# Patient Record
Sex: FEMALE | Race: WHITE | NOT HISPANIC OR LATINO | Employment: OTHER | URBAN - METROPOLITAN AREA
[De-identification: names, ages, dates, MRNs, and addresses within clinical notes are randomized per-mention and may not be internally consistent; named-entity substitution may affect disease eponyms.]

---

## 2018-06-22 DIAGNOSIS — R79.89 ABNORMAL TSH: Primary | ICD-10-CM

## 2018-06-26 ENCOUNTER — HOSPITAL ENCOUNTER (OUTPATIENT)
Dept: RADIOLOGY | Facility: HOSPITAL | Age: 68
Discharge: HOME | End: 2018-06-26
Attending: FAMILY MEDICINE
Payer: MEDICARE

## 2018-06-26 DIAGNOSIS — R79.89 ABNORMAL TSH: ICD-10-CM

## 2018-06-26 PROCEDURE — 76536 US EXAM OF HEAD AND NECK: CPT | Mod: TC

## 2018-06-26 PROCEDURE — 76536 US EXAM OF HEAD AND NECK: CPT | Mod: 26,,, | Performed by: RADIOLOGY

## 2019-03-08 ENCOUNTER — TELEPHONE (OUTPATIENT)
Dept: ORTHOPEDICS | Facility: CLINIC | Age: 69
End: 2019-03-08

## 2019-03-08 ENCOUNTER — HOSPITAL ENCOUNTER (EMERGENCY)
Facility: HOSPITAL | Age: 69
Discharge: HOME | End: 2019-03-08
Attending: EMERGENCY MEDICINE
Payer: MEDICARE

## 2019-03-08 VITALS
WEIGHT: 113 LBS | BODY MASS INDEX: 20.02 KG/M2 | DIASTOLIC BLOOD PRESSURE: 59 MMHG | SYSTOLIC BLOOD PRESSURE: 134 MMHG | HEIGHT: 63 IN | TEMPERATURE: 98 F | RESPIRATION RATE: 20 BRPM | OXYGEN SATURATION: 100 % | HEART RATE: 77 BPM
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
## 2019-03-08 DIAGNOSIS — S42.292A CLOSED FRACTURE OF HEAD OF LEFT HUMERUS, INITIAL ENCOUNTER: Primary | ICD-10-CM

## 2019-03-08 PROCEDURE — 73030 XR SHOULDER TRAUMA 3 VIEW LEFT: ICD-10-PCS | Mod: 26,LT,, | Performed by: RADIOLOGY

## 2019-03-08 PROCEDURE — 73030 X-RAY EXAM OF SHOULDER: CPT | Mod: 26,LT,, | Performed by: RADIOLOGY

## 2019-03-08 PROCEDURE — 73030 X-RAY EXAM OF SHOULDER: CPT | Mod: TC,FY,LT

## 2019-03-08 PROCEDURE — 99283 EMERGENCY DEPT VISIT LOW MDM: CPT

## 2019-03-08 RX ORDER — HYDROCODONE BITARTRATE AND ACETAMINOPHEN 5; 325 MG/1; MG/1
1 TABLET ORAL EVERY 6 HOURS PRN
Qty: 12 TABLET | Refills: 0 | Status: SHIPPED | OUTPATIENT
Start: 2019-03-08 | End: 2019-03-13

## 2019-03-08 NOTE — ED PROVIDER NOTES
Encounter Date: 3/8/2019       History     Chief Complaint   Patient presents with    Fall    Shoulder Injury     Mahogany Atkinson is a 68 y.o female with no sign PMHx. She presents to ED with complaint of left shoulder pain  Patient fell directly onto left shoulder just prior to arrival  She denies any other injuries  She denies head injury or LOC          Review of patient's allergies indicates:   Allergen Reactions    Penicillins      History reviewed. No pertinent past medical history.  History reviewed. No pertinent surgical history.  History reviewed. No pertinent family history.  Social History     Tobacco Use    Smoking status: Never Smoker    Smokeless tobacco: Never Used   Substance Use Topics    Alcohol use: No     Frequency: Never    Drug use: No     Review of Systems   Constitutional: Negative for fever.   HENT: Negative for sore throat.    Respiratory: Negative for shortness of breath.    Cardiovascular: Negative for chest pain.   Gastrointestinal: Negative for nausea.   Genitourinary: Negative for dysuria.   Musculoskeletal: Positive for arthralgias (left shoulder pain). Negative for back pain.   Skin: Negative for rash.   Neurological: Negative for weakness.   Hematological: Does not bruise/bleed easily.   All other systems reviewed and are negative.      Physical Exam     Initial Vitals [03/08/19 1139]   BP Pulse Resp Temp SpO2   (!) 134/59 77 20 98 °F (36.7 °C) 100 %      MAP       --         Physical Exam    Nursing note and vitals reviewed.  Constitutional: She appears well-developed and well-nourished.   HENT:   Head: Normocephalic.   Eyes: Pupils are equal, round, and reactive to light.   Neck: Normal range of motion.   Cardiovascular: Normal rate, regular rhythm and normal heart sounds.   Pulmonary/Chest: Breath sounds normal.   Musculoskeletal: She exhibits edema and tenderness.        Left shoulder: She exhibits decreased range of motion, tenderness, bony tenderness, pain and spasm. She  exhibits no swelling, no effusion, no crepitus, no deformity, no laceration, normal pulse and normal strength.   Neurological: She is alert and oriented to person, place, and time.   Skin: Skin is warm and dry.   Psychiatric: She has a normal mood and affect. Her behavior is normal. Judgment and thought content normal.         ED Course   Procedures  Labs Reviewed - No data to display       Imaging Results          X-Ray Shoulder Trauma Left (Final result)  Result time 03/08/19 12:22:52    Final result by Michel Beck MD (03/08/19 12:22:52)                 Impression:      1. Comminuted mildly displaced humeral head fracture extending to involve the greater tuberosity.  2. Bone demineralization.      Electronically signed by: Michel Beck  Date:    03/08/2019  Time:    12:22             Narrative:    EXAMINATION:  XR SHOULDER TRAUMA 3 VIEW LEFT    CLINICAL HISTORY:  Unspecified fall, initial encounter    TECHNIQUE:  Three views of the left shoulder were performed.    COMPARISON  None    FINDINGS:  There is a comminuted mildly displaced oblique fracture of the humeral head extending to involve the greater tuberosity.  There is adjacent soft tissue swelling.  The humeral head remains normally positioned within the glenoid cavity.    The AC joint is intact.    There is mild bone demineralization.  Visualized left lung is clear.                                 Medical Decision Making:   Initial Assessment:   Patient with complaint of left shoulder pain  Patient fell directly onto left shoulder just prior to arrival  She denies any other injuries  She denies head injury or LOC    She has limited ROM. +bony tenderness at humeral head  Normal neurovascular status to distal extrmeity  Differential Diagnosis:   Shoulder dislocation, fracture  Rotator cuff injury    Clinical Tests:   Radiological Study: Ordered  ED Management:  Patient refused pain medication at this time. Ice to shoulder    XR with fracture to  humeral head    Shoulder immobilizer.     Discussed physical exam findings with patient  No acute emergent medical condition identified at this time to warrant further testing/diagnostics.  Plan to discharge patient home with instructions per AVS.  Follow-up with Ortho in 2-5 days or return to ED if symptoms worsen.  Patient verbalized agreement to discharge treatment plan.  Other:   I discussed test(s) with the performing physician.                      Clinical Impression:       ICD-10-CM ICD-9-CM   1. Closed fracture of head of left humerus, initial encounter S42.292A 812.09   2. Fall W19.XXXA E888.9                                Shira Graham NP  03/08/19 9322

## 2019-03-08 NOTE — TELEPHONE ENCOUNTER
----- Message from Katya Paz sent at 3/8/2019  1:50 PM CST -----  Contact: Spouse  Type: Needs Medical Advice    Who Called:  Avtar-spouse  Best Call Back Number: [phone] (home)  Additional Information: pt needs to see the Dr in one week per hosp she fell and injured her left shoulder would like a call back to schedule and appt

## 2019-03-08 NOTE — TELEPHONE ENCOUNTER
Returned patient's call. Appointment made and patient voiced understanding of appointment date, time, and location.

## 2019-03-11 DIAGNOSIS — M25.512 LEFT SHOULDER PAIN, UNSPECIFIED CHRONICITY: Primary | ICD-10-CM

## 2019-03-13 ENCOUNTER — OFFICE VISIT (OUTPATIENT)
Dept: ORTHOPEDICS | Facility: CLINIC | Age: 69
End: 2019-03-13
Payer: MEDICARE

## 2019-03-13 ENCOUNTER — HOSPITAL ENCOUNTER (OUTPATIENT)
Dept: RADIOLOGY | Facility: HOSPITAL | Age: 69
Discharge: HOME | End: 2019-03-13
Attending: ORTHOPAEDIC SURGERY
Payer: MEDICARE

## 2019-03-13 VITALS — HEIGHT: 63 IN | WEIGHT: 113.13 LBS | BODY MASS INDEX: 20.04 KG/M2 | RESPIRATION RATE: 18 BRPM

## 2019-03-13 DIAGNOSIS — S42.202A CLOSED FRACTURE OF PROXIMAL END OF LEFT HUMERUS, INITIAL ENCOUNTER: ICD-10-CM

## 2019-03-13 DIAGNOSIS — M25.512 LEFT SHOULDER PAIN, UNSPECIFIED CHRONICITY: ICD-10-CM

## 2019-03-13 PROCEDURE — 99203 PR OFFICE/OUTPT VISIT, NEW, LEVL III, 30-44 MIN: ICD-10-PCS | Mod: 57,S$PBB,, | Performed by: ORTHOPAEDIC SURGERY

## 2019-03-13 PROCEDURE — 99999 PR PBB SHADOW E&M-EST. PATIENT-LVL II: ICD-10-PCS | Mod: PBBFAC,,, | Performed by: ORTHOPAEDIC SURGERY

## 2019-03-13 PROCEDURE — 23600 CLTX PROX HUMRL FX W/O MNPJ: CPT | Mod: S$PBB,LT,, | Performed by: ORTHOPAEDIC SURGERY

## 2019-03-13 PROCEDURE — 23600 CLTX PROX HUMRL FX W/O MNPJ: CPT | Mod: PBBFAC,PN | Performed by: ORTHOPAEDIC SURGERY

## 2019-03-13 PROCEDURE — 73030 X-RAY EXAM OF SHOULDER: CPT | Mod: TC,PN,LT

## 2019-03-13 PROCEDURE — 73030 X-RAY EXAM OF SHOULDER: CPT | Mod: 26,LT,, | Performed by: RADIOLOGY

## 2019-03-13 PROCEDURE — 99212 OFFICE O/P EST SF 10 MIN: CPT | Mod: PBBFAC,25,PN | Performed by: ORTHOPAEDIC SURGERY

## 2019-03-13 PROCEDURE — 23600 PR CLOSED RX PROX HUMERUS FRACTURE: ICD-10-PCS | Mod: S$PBB,LT,, | Performed by: ORTHOPAEDIC SURGERY

## 2019-03-13 PROCEDURE — 73030 XR SHOULDER COMPLETE 2 OR MORE VIEWS LEFT: ICD-10-PCS | Mod: 26,LT,, | Performed by: RADIOLOGY

## 2019-03-13 PROCEDURE — 99999 PR PBB SHADOW E&M-EST. PATIENT-LVL II: CPT | Mod: PBBFAC,,, | Performed by: ORTHOPAEDIC SURGERY

## 2019-03-13 PROCEDURE — 99203 OFFICE O/P NEW LOW 30 MIN: CPT | Mod: 57,S$PBB,, | Performed by: ORTHOPAEDIC SURGERY

## 2019-03-13 NOTE — PROGRESS NOTES
Subjective:      Patient ID: Mahogany Atkinson is a 68 y.o. female.    Chief Complaint: Pain and Injury of the Left Shoulder    Referring Provider: No referring provider defined for this encounter.    HPI:  Ms. Atknison is a 68-year-old right-hand-dominant female who presented today for evaluation of 5 days of left shoulder pain which began on 03/08/2019 after she slipped and fell after she caught her foot on herself falling onto her left shoulder.  She denied numbness or tingling in her shoulder.  Flexing and extending her elbow increases her symptoms while supporting her arm in a flexed position decreases them. She was seen in the emergency room on her date of injury was placed in a shoulder immobilizer.    Past medical history:  Hypothyroidism  Gout    Past Surgical History:   Procedure Laterality Date     * CATARACT EXTRACTION  COLONOSCOPY Left        Review of patient's allergies indicates:   Allergen Reactions    Penicillins        Social History     Occupational History         Tobacco Use    Smoking status: Never Smoker    Smokeless tobacco: Never Used   Substance and Sexual Activity    Alcohol use: No     Frequency: Never    Drug use: No    Sexual activity: Not on file      Family History   Problem Relation Age of Onset    Heart disease Mother     Diabetes Mother     Heart failure Mother     No Known Problems Sister     Coronary artery disease Brother     No Known Problems Son     No Known Problems Son     No Known Problems Son        Previous Hospitalizations:  Childbirth.    ROS:    Review of Systems   Constitution: Negative for chills and fever.   HENT: Negative for congestion.    Eyes: Negative for blurred vision.   Cardiovascular: Negative for chest pain.   Respiratory: Negative for cough.    Endocrine: Negative for polydipsia.   Hematologic/Lymphatic: Does not bruise/bleed easily.   Skin: Negative for skin cancer.   Musculoskeletal: Positive for gout.   Gastrointestinal: Negative for  constipation, diarrhea and heartburn.   Genitourinary: Negative for nocturia.   Neurological: Negative for headaches and seizures.   Psychiatric/Behavioral: Negative for depression.   Allergic/Immunologic: Negative for environmental allergies.           Objective:      Physical Exam:   General: AAOx3.  No acute distress  HEENT: Normocephalic, PEARLA EOMI, fair Dentition  Neck: Supple, No JVD  Chest: Symetric, equal excursion on inspiration  Abdomen: Soft NTND  Vascular:  Pulses intact and equal bilaterally.  Capillary refill less than 3 seconds and equal bilaterally  Neurologic:  Pinprick and soft touch over both deltoids intact and equal bilaterally  Integment:  Dependent ecchymosis left elbow.  Extremity:  Shoulder:  Allowable passive forward flexion/abduction 0/30 degrees. Tender with passive motion. Tender with palpation left shoulder.  Good motion the patient's elbow with flexion extension. Mild swelling left shoulder.  Radiography:  Personally reviewed x-rays of the left shoulder completed on 03/13/2019 showed a nondisplaced greater tuberosity fracture of the left proximal humerus with no change from x-rays taken on 03/08/2019.      Assessment:       Impression:      1. Closed fracture of proximal end of left humerus, initial encounter          Plan:       1.  Discussed physical examination and radiographic findings with the patient. Mahogany understands that she has fractured the greater tuberosity of her left proximal humerus.  Since it is nondisplaced and can be treated conservatively.  She understands she will need to be followed with serial x-rays.    2.  Continue with shoulder immobilizer.  3.  Home exercises to include passive elbow exercises were shown discussed with the patient.  4.  Offered pain med she declined.  The patient can treat her pain with over-the-counter medications dosed per box instructions.  5.  Will hold off on referral to physical therapy for now and will refer her after approximately 1  month to start shoulder motion exercises.  6.  [facility] portal was discussed with the patient and information was given.  The patient was encouraged to use the portal for future encounters.  7.  Follow up in 1 week with an x-ray of the left shoulder.

## 2019-03-18 DIAGNOSIS — M25.512 LEFT SHOULDER PAIN, UNSPECIFIED CHRONICITY: Primary | ICD-10-CM

## 2019-03-20 ENCOUNTER — OFFICE VISIT (OUTPATIENT)
Dept: ORTHOPEDICS | Facility: CLINIC | Age: 69
End: 2019-03-20
Payer: MEDICARE

## 2019-03-20 ENCOUNTER — HOSPITAL ENCOUNTER (OUTPATIENT)
Dept: RADIOLOGY | Facility: HOSPITAL | Age: 69
Discharge: HOME | End: 2019-03-20
Attending: ORTHOPAEDIC SURGERY
Payer: MEDICARE

## 2019-03-20 VITALS — HEIGHT: 63 IN | BODY MASS INDEX: 20.04 KG/M2 | WEIGHT: 113.13 LBS

## 2019-03-20 DIAGNOSIS — M25.512 LEFT SHOULDER PAIN, UNSPECIFIED CHRONICITY: ICD-10-CM

## 2019-03-20 DIAGNOSIS — S42.202D CLOSED FRACTURE OF PROXIMAL END OF LEFT HUMERUS WITH ROUTINE HEALING, SUBSEQUENT ENCOUNTER: Primary | ICD-10-CM

## 2019-03-20 PROCEDURE — 99212 OFFICE O/P EST SF 10 MIN: CPT | Mod: PBBFAC,25,PN | Performed by: ORTHOPAEDIC SURGERY

## 2019-03-20 PROCEDURE — 99024 PR POST-OP FOLLOW-UP VISIT: ICD-10-PCS | Mod: POP,,, | Performed by: ORTHOPAEDIC SURGERY

## 2019-03-20 PROCEDURE — 99024 POSTOP FOLLOW-UP VISIT: CPT | Mod: POP,,, | Performed by: ORTHOPAEDIC SURGERY

## 2019-03-20 PROCEDURE — 73030 XR SHOULDER COMPLETE 2 OR MORE VIEWS LEFT: ICD-10-PCS | Mod: 26,LT,, | Performed by: RADIOLOGY

## 2019-03-20 PROCEDURE — 73030 X-RAY EXAM OF SHOULDER: CPT | Mod: 26,LT,, | Performed by: RADIOLOGY

## 2019-03-20 PROCEDURE — 73030 X-RAY EXAM OF SHOULDER: CPT | Mod: TC,PN,LT

## 2019-03-20 PROCEDURE — 99999 PR PBB SHADOW E&M-EST. PATIENT-LVL II: ICD-10-PCS | Mod: PBBFAC,,, | Performed by: ORTHOPAEDIC SURGERY

## 2019-03-20 PROCEDURE — 99999 PR PBB SHADOW E&M-EST. PATIENT-LVL II: CPT | Mod: PBBFAC,,, | Performed by: ORTHOPAEDIC SURGERY

## 2019-03-20 NOTE — PROGRESS NOTES
Subjective:      Patient ID: Mahogany Atkinson is a 68 y.o. female.    Chief Complaint: Pain of the Left Shoulder    HPI:  Ms. Atkinson returns today for serial x-rays on the left proximal humerus greater tuberosity fracture.  She has been in a shoulder immobilizer.  She is doing well and states her pain is well controlled.  She originally injured her shoulder on 03/08/2019 after she slipped and fell after she caught her foot on herself falling onto her left shoulder.    ROS:  No new diagnosis/surgery/prescriptions since last office visit on 03/13/2019.      Objective:      Physical Exam:   General: AAOx3.  No acute distress  Vascular:  Pulses intact and equal bilaterally.  Capillary refill less than 3 seconds and equal bilaterally  Neurologic:  Pinprick and soft touch intact and equal bilaterally  Integment:  Scarlett humeral ecchymosis with some dependent ecchymosis at the elbow.  Extremity: Shoulder:  Allowable passive forward flexion/abduction 0/30 degrees. Tender with passive motion. Tender with palpation left shoulder.  Good motion the patient's elbow with flexion extension. Mild swelling left shoulder.  Radiography:  Personally reviewed x-rays of the left shoulder completed on 03/20/2019 showed a nondisplaced greater tuberosity fracture of the left proximal humerus with no change from x-rays taken on 03/13/2019.      Assessment:       Impression:  Nondisplaced greater tuberosity fracture, left proximal humerus.      Plan:       1.  Discussed physical examination and radiographic findings with the patient. Mahogany understands that she has maintained alignment of her proximal humerus and is doing well. At this point since the fracture has not moved she can continue with her shoulder immobilizer and gentle motion exercises.  2.  Continue with shoulder immobilizer.  3.  Any pain control with over-the-counter medications dosed per box instructions.  4.  In 1 week start gentle Codman exercises.  5.  Will hold off on referral to  physical therapy until she returns in 1 month then expect to refer her to occupational therapy to start gentle shoulder motion exercises.  6.  [facility] portal was discussed with the patient and information was given.  The patient was encouraged to use the portal for future encounters.  7.  Follow up in 1 month with an x-ray of the left shoulder.

## 2019-04-18 DIAGNOSIS — M25.512 LEFT SHOULDER PAIN, UNSPECIFIED CHRONICITY: Primary | ICD-10-CM

## 2019-04-24 ENCOUNTER — HOSPITAL ENCOUNTER (OUTPATIENT)
Dept: RADIOLOGY | Facility: HOSPITAL | Age: 69
Discharge: HOME | End: 2019-04-24
Attending: ORTHOPAEDIC SURGERY
Payer: MEDICARE

## 2019-04-24 ENCOUNTER — OFFICE VISIT (OUTPATIENT)
Dept: ORTHOPEDICS | Facility: CLINIC | Age: 69
End: 2019-04-24
Payer: MEDICARE

## 2019-04-24 VITALS — HEIGHT: 63 IN | BODY MASS INDEX: 20.04 KG/M2 | WEIGHT: 113.13 LBS

## 2019-04-24 DIAGNOSIS — S42.202D CLOSED FRACTURE OF PROXIMAL END OF LEFT HUMERUS WITH ROUTINE HEALING, UNSPECIFIED FRACTURE MORPHOLOGY, SUBSEQUENT ENCOUNTER: Primary | ICD-10-CM

## 2019-04-24 DIAGNOSIS — S42.255D NONDISPLACED FRACTURE OF GREATER TUBEROSITY OF LEFT HUMERUS, SUBSEQUENT ENCOUNTER FOR FRACTURE WITH ROUTINE HEALING: Primary | ICD-10-CM

## 2019-04-24 DIAGNOSIS — M25.512 LEFT SHOULDER PAIN, UNSPECIFIED CHRONICITY: ICD-10-CM

## 2019-04-24 PROCEDURE — 99999 PR PBB SHADOW E&M-EST. PATIENT-LVL II: CPT | Mod: PBBFAC,,, | Performed by: ORTHOPAEDIC SURGERY

## 2019-04-24 PROCEDURE — 99999 PR PBB SHADOW E&M-EST. PATIENT-LVL II: ICD-10-PCS | Mod: PBBFAC,,, | Performed by: ORTHOPAEDIC SURGERY

## 2019-04-24 PROCEDURE — 99024 PR POST-OP FOLLOW-UP VISIT: ICD-10-PCS | Mod: POP,,, | Performed by: ORTHOPAEDIC SURGERY

## 2019-04-24 PROCEDURE — 73030 XR SHOULDER COMPLETE 2 OR MORE VIEWS LEFT: ICD-10-PCS | Mod: 26,LT,, | Performed by: RADIOLOGY

## 2019-04-24 PROCEDURE — 99212 OFFICE O/P EST SF 10 MIN: CPT | Mod: PBBFAC,25,PN | Performed by: ORTHOPAEDIC SURGERY

## 2019-04-24 PROCEDURE — 99024 POSTOP FOLLOW-UP VISIT: CPT | Mod: POP,,, | Performed by: ORTHOPAEDIC SURGERY

## 2019-04-24 PROCEDURE — 73030 X-RAY EXAM OF SHOULDER: CPT | Mod: TC,PN,LT

## 2019-04-24 PROCEDURE — 73030 X-RAY EXAM OF SHOULDER: CPT | Mod: 26,LT,, | Performed by: RADIOLOGY

## 2019-04-24 NOTE — PROGRESS NOTES
Subjective:      Patient ID: Mahogany Atkinson is a 68 y.o. female.    Chief Complaint: Pain and Injury of the Left Shoulder      HPI: Ms. Atkinson returned today for approximately 6 week follow-up on a nondisplaced greater tuberosity fracture of her left shoulder.  She does not have a shoulder immobilizer sling on today.  She states she has greatly improved and her pain is well controlled.  Her dated injury was 03/08/2019.    ROS:  No new diagnosis/surgery/prescriptions since last office visit on 03/20/2019.      Objective:      Physical Exam:   General: AAOx3.  No acute distress  Vascular:  Pulses intact and equal bilaterally.  Capillary refill less than 3 seconds and equal bilaterally  Neurologic:  Pinprick and soft touch intact and equal bilaterally  Integment:  No ecchymosis, no errythema  Extremity:  Shoulder:  Forward flexion/abduction active left shoulder 0/100 degrees. Passive 0/120 degrees. Relatively nontender with shoulder motion. No swelling. Mild tenderness in the bicipital groove.  Radiography:  Personally reviewed x-rays of the left shoulder completed on 04/24/2019 which showed a nondisplaced greater tuberosity fracture with callus present.        Assessment:       Impression:  Healing greater tuberosity fracture, left shoulder.      Plan:       1.  Discussed physical examination and radiographic findings with the patient. Mahogany understands that she is healing her fracture and at this point she can start some gentle motion exercises.  2.  Referred to physical therapy to start progressive motion and strengthening exercises.  3.  Home exercises to include wall walking, cane exercises, Codman exercises, and towel exercises were shown discussed with the patient.  4.  Any pain can be treated with over-the-counter medications dosed per box instructions.  5.  [facility] portal was discussed with the patient and information was given.  The patient was encouraged to use the portal for future encounters.  6.  Follow up in  approximately 6 weeks for re-evaluation.  Expect to return the patient to full activities at next visit.

## 2019-04-29 ENCOUNTER — TELEPHONE (OUTPATIENT)
Dept: ORTHOPEDICS | Facility: CLINIC | Age: 69
End: 2019-04-29

## 2019-04-29 DIAGNOSIS — S42.202D CLOSED FRACTURE OF PROXIMAL END OF LEFT HUMERUS WITH ROUTINE HEALING, UNSPECIFIED FRACTURE MORPHOLOGY, SUBSEQUENT ENCOUNTER: Primary | ICD-10-CM

## 2019-04-29 NOTE — TELEPHONE ENCOUNTER
----- Message from Marjan Astudillo sent at 4/29/2019 12:55 PM CDT -----  Contact: pt  Pt states that she is needing her records sent to [facility],please notify when been sent,have an appointment on Wed..[phone] (home)

## 2019-04-29 NOTE — TELEPHONE ENCOUNTER
Called Deng from [facility]. Faxed referral information to [facility] as requested by patient. Patient notified.       [facility] Phone: [phone]  [facility] Fax: [phone]

## 2020-10-24 ENCOUNTER — IMMUNIZATION (OUTPATIENT)
Dept: FAMILY MEDICINE | Facility: CLINIC | Age: 70
End: 2020-10-24
Payer: MEDICARE

## 2020-10-24 PROCEDURE — G0008 FLU VACCINE - QUADRIVALENT - ADJUVANTED: ICD-10-PCS | Mod: S$GLB,,, | Performed by: UROLOGY

## 2020-10-24 PROCEDURE — 90694 VACC AIIV4 NO PRSRV 0.5ML IM: CPT | Mod: S$GLB,,, | Performed by: UROLOGY

## 2020-10-24 PROCEDURE — 90694 FLU VACCINE - QUADRIVALENT - ADJUVANTED: ICD-10-PCS | Mod: S$GLB,,, | Performed by: UROLOGY

## 2020-10-24 PROCEDURE — G0008 ADMIN INFLUENZA VIRUS VAC: HCPCS | Mod: S$GLB,,, | Performed by: UROLOGY

## 2020-12-15 ENCOUNTER — OFFICE VISIT (OUTPATIENT)
Dept: PODIATRY | Facility: CLINIC | Age: 70
End: 2020-12-15
Payer: MEDICARE

## 2020-12-15 VITALS
BODY MASS INDEX: 19.22 KG/M2 | DIASTOLIC BLOOD PRESSURE: 78 MMHG | WEIGHT: 108.5 LBS | HEART RATE: 74 BPM | TEMPERATURE: 98 F | SYSTOLIC BLOOD PRESSURE: 150 MMHG | OXYGEN SATURATION: 99 % | HEIGHT: 63 IN | RESPIRATION RATE: 19 BRPM

## 2020-12-15 DIAGNOSIS — Q66.70 PES CAVUS: ICD-10-CM

## 2020-12-15 DIAGNOSIS — M72.2 PLANTAR FASCIAL FIBROMATOSIS OF LEFT FOOT: Primary | ICD-10-CM

## 2020-12-15 DIAGNOSIS — L85.1 ACQUIRED KERATOSIS OF PLANTAR ASPECT OF FOOT: ICD-10-CM

## 2020-12-15 PROCEDURE — 99999 PR PBB SHADOW E&M-EST. PATIENT-LVL III: ICD-10-PCS | Mod: PBBFAC,,, | Performed by: PODIATRIST

## 2020-12-15 PROCEDURE — 99213 OFFICE O/P EST LOW 20 MIN: CPT | Mod: PBBFAC | Performed by: PODIATRIST

## 2020-12-15 PROCEDURE — 99203 OFFICE O/P NEW LOW 30 MIN: CPT | Mod: S$PBB,,, | Performed by: PODIATRIST

## 2020-12-15 PROCEDURE — 99203 PR OFFICE/OUTPT VISIT, NEW, LEVL III, 30-44 MIN: ICD-10-PCS | Mod: S$PBB,,, | Performed by: PODIATRIST

## 2020-12-15 PROCEDURE — 99999 PR PBB SHADOW E&M-EST. PATIENT-LVL III: CPT | Mod: PBBFAC,,, | Performed by: PODIATRIST

## 2020-12-16 NOTE — PROGRESS NOTES
Subjective:       Patient ID: Mahogany Atkinson is a 70 y.o. female.    Chief Complaint: Foot Problem  Patient presents with concern regarding lump on the bottom of the left foot, in the arch.  Just noticed it within the last few weeks.  Denies pain but she did just recently change into a better, more supportive tennis shoe than what she was wearing previously.  Patient also complains of painful areas on the ball of both feet, which she has had for a long time, has 1 on the bottom of the right foot which has been painful recently.    History reviewed. No pertinent past medical history.  Past Surgical History:   Procedure Laterality Date    CATARACT EXTRACTION Left      Family History   Problem Relation Age of Onset    Heart disease Mother     Diabetes Mother     Heart failure Mother     No Known Problems Sister     Coronary artery disease Brother     No Known Problems Son     No Known Problems Son     No Known Problems Son      Social History     Socioeconomic History    Marital status:      Spouse name: Not on file    Number of children: Not on file    Years of education: Not on file    Highest education level: Not on file   Occupational History    Not on file   Social Needs    Financial resource strain: Not on file    Food insecurity     Worry: Not on file     Inability: Not on file    Transportation needs     Medical: Not on file     Non-medical: Not on file   Tobacco Use    Smoking status: Never Smoker    Smokeless tobacco: Never Used   Substance and Sexual Activity    Alcohol use: No     Frequency: Never    Drug use: No    Sexual activity: Yes     Partners: Male   Lifestyle    Physical activity     Days per week: Not on file     Minutes per session: Not on file    Stress: Not on file   Relationships    Social connections     Talks on phone: Not on file     Gets together: Not on file     Attends Mosque service: Not on file     Active member of club or organization: Not on file      "Attends meetings of clubs or organizations: Not on file     Relationship status: Not on file   Other Topics Concern    Not on file   Social History Narrative    Not on file       Current Outpatient Medications   Medication Sig Dispense Refill    vit A-vit C-vit E-zinc-copper 7,160-113-100 unit-mg-unit Tab Take by mouth. TEBS Vit      omega 3-dha-epa-fish oil (FISH OIL) 100-160-1,000 mg Cap Fish Oil       No current facility-administered medications for this visit.      Review of patient's allergies indicates:   Allergen Reactions    Penicillins        Review of Systems   Constitutional: Negative for fever.   HENT: Negative for congestion.    Respiratory: Negative for cough and shortness of breath.    Musculoskeletal: Negative for gait problem.   All other systems reviewed and are negative.      Objective:      Vitals:    12/15/20 1025   BP: (!) 150/78   Pulse: 74   Resp: 19   Temp: 98.2 °F (36.8 °C)   TempSrc: Temporal   SpO2: 99%   Weight: 49.2 kg (108 lb 8 oz)   Height: 5' 3" (1.6 m)     Physical Exam  Vitals signs and nursing note reviewed.   Constitutional:       General: She is not in acute distress.  Cardiovascular:      Pulses:           Dorsalis pedis pulses are 2+ on the right side and 2+ on the left side.        Posterior tibial pulses are 2+ on the right side and 2+ on the left side.   Pulmonary:      Effort: Pulmonary effort is normal.   Musculoskeletal:         General: No swelling.      Right foot: Normal range of motion. Prominent metatarsal heads present.      Left foot: Normal range of motion. Prominent metatarsal heads present.   Feet:      Right foot:      Protective Sensation: 2 sites tested. 2 sites sensed.      Skin integrity: Callus present.      Left foot:      Protective Sensation: 2 sites tested. 2 sites sensed.      Skin integrity: Callus present.   Skin:     Capillary Refill: Capillary refill takes 2 to 3 seconds.      Comments: Plantar fibroma left foot,  Non-tender no surrounding " edema, erythema or calor   Neurological:      General: No focal deficit present.   Psychiatric:         Mood and Affect: Mood normal.         Behavior: Behavior normal.     nontender plantar fibroma left foot, stable  bony, narrow, foot type, lack of fat pad, pes cavus foot foot structure with prominent metatarsal heads. Well developed and chronic IPK's throughout the ball of both feet, most tender sub 2nd metatarsal head right foot.  Upon debridement no drainage or skin opening.  No surrounding erythema or calor.  Overall dry skin texture bilateral feet               Assessment:       1. Plantar fascial fibromatosis of left foot    2. Pes cavus    3. Acquired keratosis of plantar aspect of foot        Plan:         Reviewed foot type and structure with patient, high arches which put show tension on the plantar fascia and commonly developed plantar fibromas.    We discussed this small, benign, stable mass in the left arch.  Encouraged patient to utilize over-the-counter Aspercreme or Voltaren Gel.  Advised patient it may help shrink fibroma slightly, however it is firmly attached to the plantar fascia and do not resolve.   Explained appropriate shoes she presents in today are perfect to avoid further problems regarding this area.   In addition we discussed arch support in wearing a light tennis shoe indoors, preferably with memory foam, absolutely no flat shoes or walking barefoot  We discussed foot type and structure relating to IPK's/seed corns.  Advised most tender area ball of the right foot should be treated with moisturizer tea tree oil.  This area was debrided and advised patient since she is just obtained the new shoes she presents in with today it should help significantly along with keeping dry skin on both feet moisturized on a regular basis.  Shoes indoors also will help prevent recurrence as well as arch supports  Instructed patient to monitor for any changes regarding these areas, contact the office if  any pain develops  Patient were in understanding and agreement with treatment plan.  I counseled the patient on their conditions, implications and medical management.  Instructed patient/family to contact the office with any changes, questions, concerns, worsening of symptoms.   Total face-to-face time, exam, assessment, treatment, discussion, documentation 30 minutes, more than half this time spent on consultation and coordination of care.  Follow up as needed    This note was created using [employer] voice recognition software that occasionally misinterpreted phrases or words.

## 2021-01-09 ENCOUNTER — IMMUNIZATION (OUTPATIENT)
Dept: PRIMARY CARE CLINIC | Facility: CLINIC | Age: 71
End: 2021-01-09
Payer: MEDICARE

## 2021-01-09 DIAGNOSIS — Z23 NEED FOR VACCINATION: ICD-10-CM

## 2021-01-09 PROCEDURE — 91300 COVID-19, MRNA, LNP-S, PF, 30 MCG/0.3 ML DOSE VACCINE: CPT | Mod: PBBFAC,PN

## 2021-01-30 ENCOUNTER — IMMUNIZATION (OUTPATIENT)
Dept: PRIMARY CARE CLINIC | Facility: CLINIC | Age: 71
End: 2021-01-30
Payer: MEDICARE

## 2021-01-30 DIAGNOSIS — Z23 NEED FOR VACCINATION: Primary | ICD-10-CM

## 2021-01-30 PROCEDURE — 0002A COVID-19, MRNA, LNP-S, PF, 30 MCG/0.3 ML DOSE VACCINE: CPT | Mod: PBBFAC | Performed by: EMERGENCY MEDICINE

## 2021-01-30 PROCEDURE — 91300 COVID-19, MRNA, LNP-S, PF, 30 MCG/0.3 ML DOSE VACCINE: CPT | Mod: PBBFAC | Performed by: EMERGENCY MEDICINE

## 2025-03-18 ENCOUNTER — OFFICE VISIT (OUTPATIENT)
Dept: OBSTETRICS AND GYNECOLOGY | Facility: CLINIC | Age: 75
End: 2025-03-18
Payer: MEDICARE

## 2025-03-18 VITALS
BODY MASS INDEX: 20.09 KG/M2 | DIASTOLIC BLOOD PRESSURE: 77 MMHG | WEIGHT: 113.38 LBS | HEIGHT: 63 IN | SYSTOLIC BLOOD PRESSURE: 167 MMHG

## 2025-03-18 DIAGNOSIS — N90.5 VULVAR ATROPHY: ICD-10-CM

## 2025-03-18 DIAGNOSIS — N95.2 VAGINAL ATROPHY: Primary | ICD-10-CM

## 2025-03-18 PROCEDURE — 99203 OFFICE O/P NEW LOW 30 MIN: CPT | Mod: PBBFAC

## 2025-03-18 PROCEDURE — 99203 OFFICE O/P NEW LOW 30 MIN: CPT | Mod: S$PBB,,,

## 2025-03-18 PROCEDURE — 99999 PR PBB SHADOW E&M-NEW PATIENT-LVL III: CPT | Mod: PBBFAC,,,

## 2025-03-18 RX ORDER — ESTRADIOL 0.1 MG/G
1 CREAM VAGINAL DAILY
Qty: 42.5 G | Refills: 2 | Status: SHIPPED | OUTPATIENT
Start: 2025-03-18

## 2025-03-18 NOTE — PROGRESS NOTES
Subjective     Patient ID: Mahogany Atkinson is a 74 y.o. female.    Chief Complaint:  Spots and/or Floaters      History of Present Illness  HPI  {OBG HPI BLOCKS:06274}    GYN & OB History  No LMP recorded. Patient is postmenopausal.   Date of Last Pap: No result found    OB History   No obstetric history on file.       Review of Systems  Review of Systems       Objective   Physical Exam         Assessment and Plan     No diagnosis found.   ***      Plan:  ***

## 2025-03-18 NOTE — PROGRESS NOTES
"Gynecology Visit  History & Physical      SUBJECTIVE:     History of Present Illness:  Patient is a 74 y.o. female presents with complaints of redness and white spots and vaginal area.  Patient reports that she was treated in November for possible yeast infection with nystatin cream.  During this time patient performed inspection and noticed vaginal redness the white spot.  Patient denies any vaginal itching, odor, or irritation.  Patient does report burning with application of body soap.  Patient uses dove sensitive skin or baby soap. Patient is no longer sexually active.  Patient does report occasional pad usage for urinary leakage.      Chief Complaint   Patient presents with    Spots and/or Floaters       Review of patient's allergies indicates:   Allergen Reactions    Penicillins        Current Medications[1]  OB History    No obstetric history on file.     No LMP recorded. Patient is postmenopausal.      History reviewed. No pertinent past medical history.  Past Surgical History:   Procedure Laterality Date    CATARACT EXTRACTION Left      Family History   Problem Relation Name Age of Onset    Heart disease Mother      Diabetes Mother      Heart failure Mother      No Known Problems Sister      Coronary artery disease Brother      No Known Problems Son      No Known Problems Son      No Known Problems Son       Social History[2]     OBJECTIVE:     Vital Signs (Most Recent)  BP: (!) 167/77 (03/18/25 1008)  5' 3" (1.6 m)  51.4 kg (113 lb 6.4 oz)     Physical Exam:  Physical Exam  Vitals and nursing note reviewed. Exam conducted with a chaperone present.   Constitutional:       General: She is awake.   Pulmonary:      Effort: Pulmonary effort is normal.   Genitourinary:     Exam position: Lithotomy position.      Vagina: Bleeding (abrasion like pattern noted to vaginal introitus; severe atrophy noted) present.      Comments: Bilateral labia minora and majora adhering  Skin:     General: Skin is warm and dry. "   Neurological:      Mental Status: She is alert and oriented to person, place, and time.   Psychiatric:         Attention and Perception: Attention and perception normal.         Mood and Affect: Mood and affect normal.         Speech: Speech normal.         Behavior: Behavior normal. Behavior is cooperative.         Thought Content: Thought content normal.         Cognition and Memory: Cognition normal.         Judgment: Judgment normal.       ASSESSMENT/PLAN:       ICD-10-CM ICD-9-CM   1. Vaginal atrophy  N95.2 627.3   2. Vulvar atrophy  N90.5 624.1       PLAN:  -sent in prescription for Estrace to patient pharmacy  -discussed in detail the mechanism of action, side effects, and administration instructions  -mammogram order provided today, patient to schedule  -reviewed Pap guidelines, recommend visual exam every 2 years  -return to clinic as needed    JASPREET Duvall-BC  Gynecology    [address] [place] [address]    [phone]               [1]   Current Outpatient Medications   Medication Sig Dispense Refill    omega 3-dha-epa-fish oil (FISH OIL) 100-160-1,000 mg Cap Fish Oil      vit A-vit C-vit E-zinc-copper 7,160-113-100 unit-mg-unit Tab Take by mouth. TEBS Vit       No current facility-administered medications for this visit.   [2]   Social History  Tobacco Use    Smoking status: Never    Smokeless tobacco: Never   Substance Use Topics    Alcohol use: No    Drug use: No

## 2025-03-24 ENCOUNTER — TELEPHONE (OUTPATIENT)
Dept: OBSTETRICS AND GYNECOLOGY | Facility: CLINIC | Age: 75
End: 2025-03-24
Payer: MEDICARE

## 2025-03-24 NOTE — TELEPHONE ENCOUNTER
----- Message from CATHY Duvall sent at 3/24/2025  3:11 PM CDT -----  Contact: PT  Paper MMG order provided to patient at last visit.  Can you call patient to clarify what further actions she needsThDiana pattenhan  ----- Message -----  From: Khushi Marquez MA  Sent: 3/24/2025   3:03 PM CDT  To: CATHY Peña    Pt requesting an mammo order.  ----- Message -----  From: Latonia Mooney  Sent: 3/24/2025   3:00 PM CDT  To: Caludia MCDANIEL Staff    Type:  MammogramCaller is requesting to schedule their annual mammogram appointment.  Order is not listed in EPIC.  Please enter order and contact patient to schedule.Name of Caller:PT Where would they like the mammogram performed? [facility] FAX # [phone] the patient rather a call back or a response via [facility]? CALL Best Call Back Number:[phone] Information: PLEASE INCLUDE NPI ON ORDER AND CALL PT  TO CONFIRM WHEN ORDER IS SENT THAN YOU

## 2025-03-24 NOTE — TELEPHONE ENCOUNTER
Order is being faxed to [facility] in [place]. Pt will call back if she have any problems with scheduling. Also NPI number included on order.